# Patient Record
Sex: MALE | Race: WHITE | NOT HISPANIC OR LATINO | URBAN - METROPOLITAN AREA
[De-identification: names, ages, dates, MRNs, and addresses within clinical notes are randomized per-mention and may not be internally consistent; named-entity substitution may affect disease eponyms.]

---

## 2017-05-10 ENCOUNTER — TREATMENT (OUTPATIENT)
Dept: PHYSICAL THERAPY | Facility: CLINIC | Age: 47
End: 2017-05-10

## 2017-05-10 DIAGNOSIS — M54.42 CHRONIC BILATERAL LOW BACK PAIN WITH LEFT-SIDED SCIATICA: Primary | ICD-10-CM

## 2017-05-10 DIAGNOSIS — G89.29 CHRONIC BILATERAL LOW BACK PAIN WITH LEFT-SIDED SCIATICA: Primary | ICD-10-CM

## 2017-05-10 PROCEDURE — PTSPMIN2 PR PHYS THER SP 16 TO 30 MINUTES: Performed by: PHYSICAL THERAPIST

## 2017-05-16 ENCOUNTER — TREATMENT (OUTPATIENT)
Dept: PHYSICAL THERAPY | Facility: CLINIC | Age: 47
End: 2017-05-16

## 2017-05-16 DIAGNOSIS — M54.42 CHRONIC BILATERAL LOW BACK PAIN WITH LEFT-SIDED SCIATICA: Primary | ICD-10-CM

## 2017-05-16 DIAGNOSIS — G89.29 CHRONIC BILATERAL LOW BACK PAIN WITH LEFT-SIDED SCIATICA: Primary | ICD-10-CM

## 2017-05-16 PROCEDURE — PTSPMIN2 PR PHYS THER SP 16 TO 30 MINUTES: Performed by: PHYSICAL THERAPIST

## 2017-05-25 ENCOUNTER — TREATMENT (OUTPATIENT)
Dept: PHYSICAL THERAPY | Facility: CLINIC | Age: 47
End: 2017-05-25

## 2017-05-25 DIAGNOSIS — G89.29 CHRONIC BILATERAL LOW BACK PAIN WITH LEFT-SIDED SCIATICA: Primary | ICD-10-CM

## 2017-05-25 DIAGNOSIS — M54.42 CHRONIC BILATERAL LOW BACK PAIN WITH LEFT-SIDED SCIATICA: Primary | ICD-10-CM

## 2017-05-25 PROCEDURE — DRYNDL PR CUSTOM DRY NEEDLING SELF PAY: Performed by: PHYSICAL THERAPIST

## 2017-06-20 ENCOUNTER — TREATMENT (OUTPATIENT)
Dept: PHYSICAL THERAPY | Facility: CLINIC | Age: 47
End: 2017-06-20

## 2017-06-20 DIAGNOSIS — G89.29 CHRONIC BILATERAL LOW BACK PAIN WITH LEFT-SIDED SCIATICA: Primary | ICD-10-CM

## 2017-06-20 DIAGNOSIS — M54.42 CHRONIC BILATERAL LOW BACK PAIN WITH LEFT-SIDED SCIATICA: Primary | ICD-10-CM

## 2017-06-20 PROCEDURE — PTSPMIN2 PR PHYS THER SP 16 TO 30 MINUTES: Performed by: PHYSICAL THERAPIST

## 2017-06-20 NOTE — PROGRESS NOTES
Discharge Report    Patient Name: Adithya Montes De Oca       Patient MRN: [id number]  : 1970  Physician:  Date: 2017    Encounter Diagnoses   Name Primary?   • Chronic bilateral low back pain with left-sided sciatica Yes     Treatment has included therapeutic exercise, neuromuscular re-education, manual therapy and dry needling    Physical Therapy Daily Progress Note 17    Time In 0659  Time Out 0726    Adithya Montes De Oca reports: doing much better and huge difference in symptoms and performance in  Performance Training and no longer avoiding exercises.  Pt continues to use light weight for overhead squats. Pt reports low back discomfort at 1/10 with standing but able to stand for 30 minutes without back pain.  Pt reports no longer experiencing pain in bed.     OSWESTRY- BACK    Please answer every section and crystal in each section only the ONE answer which applies to you or most closely describes your problem.  Thank you.    Section 1- Pain Intensity  [x]  The pain comes and goes and is very mild.  []  The pain is mild and does not vary much.  []  The pain comes and goes and is moderate.  []  The pain is moderate and does not vary much.  []  The pain comes and goes and is severe.  []  The pain is severe and does not vary much.    Section 2- Personal Care  [x]  I would not have to change my way of washing or dressing in order to avoid pain.  []  I do not normally change my way of washing or dressing even though it causes      some pain.  []  Washing and dressing increases the pain but I manage not to change my way of doing it.  []  Washing and dressing increase the pain and I find it necessary to change my way of doing it.  []  Because of the pain I am unable to do some washing and dressing without help.  []  Because of the pain I am unable to do any washing or dressing without help  .  Section 3- Lifting  []  I can lift heavy weight without extra pain.  [x]  I can lift heavy weight but it causes extra  pain.  []  Pain prevents me lifting heavy weight off the floor.  []  Pain prevents me lifting heavy weight off the floor but I can manage if they are conveniently positioned, e.g., on a table.  []  Pain prevents me from lifting heavy weight but I can manage light to medium weight if it is conveniently positioned.  []  I can only lift very light weight at the most.    Section 4- Walking  [x] I have no pain on walking.  [] I have some pain on walking but it does not increase with distance.  [] I cannot walk more than One Mile without increasing pain.  [] I cannot walk more than 1/2 Mile without increasing pain.  [] I cannot walk more than 1/4 Mile without increasing pain.  [] I cannot walk at all without increasing pain.    Section 5- Sitting  [x] I can sit in any chair as long as I like.  [] I can sit only in my favorite chair as long as I like.  [] Pain prevents me from sitting more than one hour.  [] Pain prevents me from sitting more than 1/2 hour.  [] Pain prevents me from sitting more than 10 minutes.  [] I avoid sitting because it increases pain straight away.      Section 6- Standing  [] I can stand as long as I want without pain.  [] I have some pain on standing but it does not increase with time.  [x] I cannot stand for longer than one hour without increasing pain.  [] I cannot stand for longer than 1/2 hour without increasing pain.  [] I cannot stand for longer than 10 minutes without increasing pain.  [] I avoid standing because it increases the pain straight away.    Section 7- Sleeping  [x] I get no pain in bed.  [] I get pain in bed but it does not prevent me from sleeping well.  [] Because of pain my normal nights sleep is reduced by less than 1/4.  [] Because of pain my normal nights sleep is reduced by less than 1/2.  [] Because of pain my normal nights sleep is reduced by less than 3/4.  [] Pain prevents me from sleeping at all.    Section 8-Social Life  [x] My social life is normal and gives me no  pain.  [] My social life is normal but increases the degree of my pain.  [] Pain has no significant effect on my social life apart from limiting my more energetic interests e.g., dancing etc.  [] Pain has restricted my social life and I do not go out very often.  [] Pain has restricted my social life to my home.  [] I have hardly any social life because of pain.    Section 9- Traveling  [x] I get no pain while traveling.  [] I get some pain while traveling but none of my usual forms of travel make it any worse.  [] I get extra pain while traveling but it does not compel me to seek alternative forms of travel.  [] I get extra pain while traveling which compels me to seek alternative forms of travel.  [] Pain restricts all forms of travel.  [] Pain prevents all forms of travel except that done lying down.    Section 10 - Changing Degree of Pain  [] My pain is rapidly getting better.  [x] My pain fluctuates but overall is definitely getting better.  [] My pain seems to be getting better but improvement is slow at present.  [] My pain is neither getting better or worse.  [] My pain is gradually worsening.  [] My pain is rapidly worsening.                                                           Initial 6 Visits D/C Change   % Score   8%    VAS #                   Subjective     Objective     Active Range of Motion     Lumbar   Flexion: WFL    Tests     Additional Tests Details  Supine leg length (-)   Adduction drop test (-) bilaterally     See Exercise, Manual, and Modality Logs for complete treatment.       Assessment & Plan     Assessment  Assessment details: Pt demonstrates improved lumbar AROM, symptoms and function.  Pt demonstrates improved standing tolerance and local lumbo pelvic control/stabilization with ability to fully participate in [facility] Performance Training classes with minimal modifications.  Pt demonstrates good understanding and compliance of HEP and is appropriate for d/c to (I) with HEP with  instruction to call PT as needed.      Other - d/c to (I) with HEP    Progress towards goals: All Met    Discharge Reason: Patient achieved goals      Plan of Care: Continue with current home exercise program as instructed    Prognosis: excellent    Understanding at Discharge: excellent    Lili Betancur, PT, DPT  Physical Therapist

## 2017-06-20 NOTE — PATIENT INSTRUCTIONS
Purpose of treatment  ISAIAH exercises 1x every other day  HEP performance  Please view [facility] Burgerl for a complete list of HEP instructions.

## 2017-06-20 NOTE — PROGRESS NOTES
Physical Therapy Daily Progress Note    Time In 0659  Time Out 0726    Adithya Montes De Oca reports: doing much better and huge difference in symptoms and performance in  Performance Training and no longer avoiding exercises.  Pt continues to use light weight for overhead squats. Pt reports low back discomfort at 1/10 with standing but able to stand for 30 minutes without back pain.  Pt reports no longer experiencing pain in bed.     Subjective     Objective     Active Range of Motion     Lumbar   Flexion: WFL    Tests     Additional Tests Details  Supine leg length (-)   Adduction drop test (-) bilaterally     See Exercise, Manual, and Modality Logs for complete treatment.       Assessment & Plan     Assessment  Assessment details: Pt demonstrates improved lumbar AROM, symptoms and function.  Pt demonstrates improved standing tolerance and local lumbo pelvic control/stabilization with ability to fully participate in  Performance Training classes with minimal modifications.  Pt demonstrates good understanding and compliance of HEP and is appropriate for d/c to (I) with HEP with instruction to call PT as needed.          Other - d/c to (I) with HEP           Manual Therapy:    -     mins  84183;  Therapeutic Exercise:    -     mins  37625;     Neuromuscular Lori:    -    mins  76193;    Therapeutic Activity:     -     mins  17360;     Gait Training:      -     mins  59700;     Ultrasound:     -     mins  08514;    Electrical Stimulation:    -     mins  78989 ( );  Dry Needling     -     mins self-pay    Timed Treatment:   -   mins   Total Treatment:     27   mins - Self Pay    Lili Betancur, PT, DPT  Physical Therapist

## 2017-10-19 ENCOUNTER — TREATMENT (OUTPATIENT)
Dept: PHYSICAL THERAPY | Facility: CLINIC | Age: 47
End: 2017-10-19

## 2017-10-19 DIAGNOSIS — G89.29 CHRONIC BILATERAL LOW BACK PAIN WITH LEFT-SIDED SCIATICA: Primary | ICD-10-CM

## 2017-10-19 DIAGNOSIS — M54.42 CHRONIC BILATERAL LOW BACK PAIN WITH LEFT-SIDED SCIATICA: Primary | ICD-10-CM

## 2017-10-19 PROCEDURE — PTSPMIN1 PR PHYS THER SP UP TO 15 MINUTES: Performed by: PHYSICAL THERAPIST

## 2017-10-19 NOTE — PATIENT INSTRUCTIONS
HEP performance  Please view [facility]ithya Blondeel for a complete list of HEP instructions.  Purpose of treatment

## 2017-10-19 NOTE — PROGRESS NOTES
Physical Therapy Initial Evaluation and Plan of Care    TIME IN 0700 TIME OUT 0718  Patient: Adithya Montes De Oca   : 1970  Diagnosis/ICD-10 Code:  Chronic bilateral low back pain with left-sided sciatica [M54.42, G89.29]  Referring practitioner: No ref. provider found     Subjective Evaluation    History of Present Illness    Subjective comment: Pt has been participating in Performance Training Recovery Class.  Pt has been keeping exercises going regularly and traveling a lot without low back pain.Pain  Current pain ratin  At best pain ratin  At worst pain ratin    Treatments  Previous treatment: physical therapy  Patient Goals  Patient goals for therapy: increased strength             Objective       Tests     Additional Tests Details  FMS for rotary stability R 3/3 and L 2/3  Sahrmann for lumbar stabilization 0.5/5 with ability to maintain neutral spine during unweighted          Assessment & Plan     Assessment  Impairments: impaired physical strength  Assessment details: Pt presents to physical therapy for check-in and progression of HEP to continue to maintain gains and progress trunk control/stabilization.  Pt demonstrates improved local trunk control/stabilization and rotary stability.  Pt demonstrated weakness on left versus right rotary stability.  Progressed HEP and reviewed performance and importance of continued HEP performance with patient.  Pt demonstrates good understanding and will follow up with PT as needed.    Goals  Plan Goals: In 4 weeks  Pt will be (I) with HEP and report no increased low back pain during daily or exercise activity.      Plan  Therapy options: will be seen for skilled physical therapy services  Planned modality interventions: thermotherapy (hydrocollator packs), TENS and electrical stimulation/Russian stimulation  Planned therapy interventions: abdominal trunk stabilization, functional ROM exercises, joint mobilization, home exercise program, manual  therapy, neuromuscular re-education, soft tissue mobilization, strengthening, stretching and therapeutic activities  Frequency: as needed - patient will call.  Duration in weeks: 4  Treatment plan discussed with: patient        Manual Therapy:    -     mins  71485;  Therapeutic Exercise:    -     mins  29598;     Neuromuscular Lori:    -    mins  40261;    Therapeutic Activity:     -     mins  18531;     Gait Training:      -     mins  76888;     Ultrasound:     -     mins  49013;    Electrical Stimulation:    -     mins  91478 ( );  Dry Needling     -     mins self-pay    Timed Treatment:   15   mins   Total Treatment:     18   mins    PT SIGNATURE: Lili Betancur, PT, DPT   DATE TREATMENT INITIATED: 10/19/2017    Initial Certification  Certification Period: 1/17/2018  I certify that the therapy services are furnished while this patient is under my care.  The services outlined above are required by this patient, and will be reviewed every 90 days.     PHYSICIAN:       DATE:     Please sign and return via fax to [phone]. Thank you, [facility].

## 2018-02-27 ENCOUNTER — TREATMENT (OUTPATIENT)
Dept: PHYSICAL THERAPY | Facility: CLINIC | Age: 48
End: 2018-02-27

## 2018-02-27 DIAGNOSIS — M25.521 RIGHT ELBOW PAIN: Primary | ICD-10-CM

## 2018-02-27 PROCEDURE — DRYNDL PR CUSTOM DRY NEEDLING SELF PAY: Performed by: PHYSICAL THERAPIST

## 2018-02-27 PROCEDURE — PTSPMIN2 PR PHYS THER SP 16 TO 30 MINUTES: Performed by: PHYSICAL THERAPIST

## 2018-02-27 NOTE — PROGRESS NOTES
Physical Therapy Initial Evaluation and Plan of Care    TIME IN 1407 TIME OUT 1446  Patient: Adithya Montes De Oca   : 1970  Diagnosis/ICD-10 Code:  Right elbow pain [M25.521]  Referring practitioner: No ref. provider found    Subjective Evaluation    History of Present Illness  Date of onset: 2018  Mechanism of injury: Pt reports insidious onset of right elbow pain about 6 weeks ago but works out regularly in [employer] Performance Training class.  Pt reports performing thruster on  and increased pain at right shoulder blade on press.    Subjective comment: Pt reports lower back is great.  Pain  Current pain ratin  At worst pain ratin (right shoulder blade)  Location: right superior to elbow joint and extending to distal to elbow joint about couple inches each direction  Quality: dull ache  Alleviating factors: arms propped up while topping, not using it.  Aggravating factors: sleeping (resting, texting)    Hand dominance: right    Diagnostic Tests  No diagnostic tests performed    Treatments  No previous or current treatments  Patient Goals  Patient goals for therapy: decreased pain, return to sport/leisure activities and increased strength             Objective       Palpation     Right   No palpable tenderness to the wrist flexors.   Hypertonic in the wrist extensors. Tenderness of the wrist extensors.     Tenderness     Right Elbow   No tenderness in the lateral epicondyle.     Right Wrist/Hand   Tenderness in the lateral epicondyle.     Strength/Myotome Testing     Right Shoulder     Isolated Muscles   Lower trapezius: 3   Middle trapezius: 3- (increased shoulder, elbow and forearm pain)     Right Wrist/Hand   Wrist extension: 4- (pain increased)  Wrist flexion: 4+         Assessment & Plan     Assessment  Impairments: impaired physical strength, lacks appropriate home exercise program and pain with function  Assessment details: Pt is active 47 y.o. Male who presents with acute right elbow and  shoulder pain.  Pt demonstrates signs and symptoms consistent with strain to right wrist extensors and supinator.  Pt also demonstrates scapular weakness contributing to elbow dysfunction and shoulder pain.  Initiated dry needling, instrument assisted soft tissue massage and therapeutic exercises today.  Pt requires skilled physical therapy to address impairments and return to prior level of function including typing, texting, and participation in workout classes without pain.  Prognosis: good  Functional Limitations: carrying objects, lifting, pulling, pushing and uncomfortable because of pain  Goals  Plan Goals: Short-Term Goals - In 2 weeks:  1. Pt will be (I) with initial HEP.  2. Pt will report pain at worst </=6/10    Long-Term Goals - In 6 weeks:  1. Pt will report no pain with work or daily activities.  2. Right wrist extension and supination strength 5-/5 without pain.  3. Pt will report ability to participate fully in [facility] Performance Training class without pain.  4. DASH score </= 5% to demonstrate functional improvement.    Plan  Therapy options: will be seen for skilled physical therapy services  Planned modality interventions: electrical stimulation/Russian stimulation, cryotherapy and thermotherapy (hydrocollator packs)  Planned therapy interventions: body mechanics training, functional ROM exercises, home exercise program, joint mobilization, manual therapy, neuromuscular re-education, soft tissue mobilization, strengthening and therapeutic activities  Frequency: 2x month  Duration in weeks: 6  Treatment plan discussed with: patient        Manual Therapy:    8     mins  80348;  Therapeutic Exercise:    10     mins  79945;     Neuromuscular Lori:    5    mins  64950;    Therapeutic Activity:     -     mins  23349;     Gait Training:      -     mins  90659;     Ultrasound:     -     mins  72540;    Electrical Stimulation:    -     mins  20270 ( );  Dry Needling     8     mins self-pay    Timed  Treatment:   23   mins   Total Treatment:     39   mins    PT SIGNATURE: Lili Betancur, PT, DPT   DATE TREATMENT INITIATED: 2/27/2018    Initial Certification  Certification Period: 5/28/2018  I certify that the therapy services are furnished while this patient is under my care.  The services outlined above are required by this patient, and will be reviewed every 90 days.     PHYSICIAN:       DATE:     Please sign and return via fax to [phone]. Thank you, [facility].

## 2018-03-13 ENCOUNTER — TREATMENT (OUTPATIENT)
Dept: PHYSICAL THERAPY | Facility: CLINIC | Age: 48
End: 2018-03-13

## 2018-03-13 DIAGNOSIS — M25.521 RIGHT ELBOW PAIN: Primary | ICD-10-CM

## 2018-03-13 PROCEDURE — PTSPMIN1 PR PHYS THER SP UP TO 15 MINUTES: Performed by: PHYSICAL THERAPIST

## 2018-03-13 PROCEDURE — DRYNDL PR CUSTOM DRY NEEDLING SELF PAY: Performed by: PHYSICAL THERAPIST

## 2018-03-13 NOTE — PROGRESS NOTES
Physical Therapy Daily Progress Note    Time In 1400  Time Out 1428    Adithya Montes De Oca reports: improvement in symptoms after last treatment session and HEP is going well with only discomfort during wrist extensor mass stretching.  Pt reports trying to use Left UE more than Right, but continued soreness in right arm doing workouts,especially with chin ups and biceps curls.     Subjective     Objective       Palpation     Right   Hypertonic in the wrist extensors. Tenderness of the wrist extensors.     Tenderness     Right Elbow   Tenderness in the lateral epicondyle.     Right Wrist/Hand   Tenderness in the lateral epicondyle.      See Exercise, Manual, and Modality Logs for complete treatment.       Assessment & Plan     Assessment  Assessment details: Pt demonstrates good compliance with HEP and attention to activity modification.  Pt continues to present with symptoms consistent with lateral epicondyalgia and associated tendinopathy.  Focused dry needling with increased dose to proximal wrist extensor mass and tendons.  Kinesiotape applied post-treatment to improve circulation and decrease pain.         Progress strengthening /stabilization /functional activity  Review HEP and update as indicated         Manual Therapy:    12     mins  76622;  Therapeutic Exercise:    -     mins  70644;     Neuromuscular Lori:    -    mins  99697;    Therapeutic Activity:     -     mins  59235;     Gait Training:      -     mins  74477;     Ultrasound:     -     mins  48459;    Electrical Stimulation:    -     mins  69194 ( );  Dry Needling     10     mins self-pay    Timed Treatment:   22   mins   Total Treatment:     28   mins    Lili Betancur, PT, DPT  Physical Therapist

## 2018-03-13 NOTE — PATIENT INSTRUCTIONS
Purpose of treatment  Pathology and involved anatomy  Activity modification including weight lifting regressions  Continued HEP performance

## 2018-03-20 ENCOUNTER — TREATMENT (OUTPATIENT)
Dept: PHYSICAL THERAPY | Facility: CLINIC | Age: 48
End: 2018-03-20

## 2018-03-20 DIAGNOSIS — M25.521 RIGHT ELBOW PAIN: Primary | ICD-10-CM

## 2018-03-20 DIAGNOSIS — G89.29 CHRONIC BILATERAL LOW BACK PAIN WITH LEFT-SIDED SCIATICA: ICD-10-CM

## 2018-03-20 DIAGNOSIS — M54.42 CHRONIC BILATERAL LOW BACK PAIN WITH LEFT-SIDED SCIATICA: ICD-10-CM

## 2018-03-20 PROCEDURE — DRYNDL PR CUSTOM DRY NEEDLING SELF PAY: Performed by: PHYSICAL THERAPIST

## 2018-03-20 PROCEDURE — PTSPMIN1 PR PHYS THER SP UP TO 15 MINUTES: Performed by: PHYSICAL THERAPIST

## 2018-03-20 NOTE — PROGRESS NOTES
Physical Therapy Daily Progress Note    Time In 0830  Time Out 0900    Adithya Montes De Oca reports: some improvement in elbow symptoms, but arm is a nuisance.      Subjective     Objective       Palpation     Right   Hypertonic in the wrist extensors. Tenderness of the wrist extensors.     Tenderness     Right Elbow   Tenderness in the lateral epicondyle.     Right Wrist/Hand   Tenderness in the lateral epicondyle.     Strength/Myotome Testing     Right Wrist/Hand   Wrist extension: 4 (increased pain)     See Exercise, Manual, and Modality Logs for complete treatment.       Assessment & Plan     Assessment  Assessment details: Reviewed and progressed low back exercises today.  Pt demonstrates excellent compliance with HEP and is able to fully participate in  Performance Training classes without low back pain. Pt demonstrates some improvement in lateral elbow symptoms and increased tolerance to activity, but continued signs and symptoms consistent with lateral epicondylagia and wrist extensor mass strain.  Decreased dose of dry needling today due to increased soreness after last treatment.             Progress per Plan of Care           Manual Therapy:    12    mins  82154;  Therapeutic Exercise:    -     mins  87860;     Neuromuscular Lori:    -    mins  40293;    Therapeutic Activity:     -     mins  95525;     Gait Training:      -     mins  85164;     Ultrasound:     -     mins  45417;    Electrical Stimulation:    -     mins  72339 ( );  Dry Needling     10     mins self-pay    Timed Treatment:   22   mins   Total Treatment:     30   mins    Lili Betancur, PT, DPT  Physical Therapist

## 2018-05-07 ENCOUNTER — TREATMENT (OUTPATIENT)
Dept: PHYSICAL THERAPY | Facility: CLINIC | Age: 48
End: 2018-05-07

## 2018-05-07 DIAGNOSIS — M25.521 RIGHT ELBOW PAIN: Primary | ICD-10-CM

## 2018-05-07 DIAGNOSIS — M54.42 CHRONIC BILATERAL LOW BACK PAIN WITH LEFT-SIDED SCIATICA: ICD-10-CM

## 2018-05-07 DIAGNOSIS — G89.29 CHRONIC BILATERAL LOW BACK PAIN WITH LEFT-SIDED SCIATICA: ICD-10-CM

## 2018-05-07 PROCEDURE — PTSPMIN1 PR PHYS THER SP UP TO 15 MINUTES: Performed by: PHYSICAL THERAPIST

## 2018-05-07 NOTE — PROGRESS NOTES
"Physical Therapy Daily Progress Note    Time In 0800  Time Out 0822    Adithya Montes De Oca reports: doing well and excellent compliance with HEP.  Pt reports elbow is now feeling like an \"old injury\".  Pt reports \"weakness\" in the morning after performing bench press.   Pt reports overall a lot better.      Subjective     Objective       Strength/Myotome Testing     Right Wrist/Hand   Wrist extension: 4 (increased pain)  Wrist flexion: 5     See Exercise, Manual, and Modality Logs for complete treatment.       Assessment & Plan     Assessment  Assessment details: Pt demonstrates decreased sensitivity, improved strength and tolerance to activity related to right elbow.  Pt continues to demonstrate weakness and pain with resisted testing.  Progressed strengthening and stretching activities for wrist extensor mass.  Pt demonstrates good understanding and excellent compliance with HEP.  Reviewed Lumbopelvic HEP and recommendations made for appropriate progressions of exercises when needed.        Progress per Plan of Care           Manual Therapy:    -     mins  45766;  Therapeutic Exercise:    15    mins  56308;     Neuromuscular Lori:    -    mins  66417;    Therapeutic Activity:     -     mins  52003;     Gait Training:      -     mins  05742;     Ultrasound:     -     mins  76148;    Electrical Stimulation:    -     mins  77012 ( );  Dry Needling     -     mins self-pay    Timed Treatment:   15   mins   Total Treatment:     22   mins    Lili Betancur, PT, DPT  Physical Therapist    "

## 2018-05-07 NOTE — PATIENT INSTRUCTIONS
Purpose of treatment  Appropriate response to treatment  HEP performance  Please view [facility]am[facility] Burgerl for a complete list of HEP instructions.

## 2018-05-09 ENCOUNTER — APPOINTMENT (OUTPATIENT)
Dept: GENERAL RADIOLOGY | Facility: HOSPITAL | Age: 48
End: 2018-05-09

## 2018-05-09 PROCEDURE — 73630 X-RAY EXAM OF FOOT: CPT | Performed by: EMERGENCY MEDICINE

## 2018-05-09 PROCEDURE — 73610 X-RAY EXAM OF ANKLE: CPT | Performed by: EMERGENCY MEDICINE

## 2018-05-30 ENCOUNTER — TREATMENT (OUTPATIENT)
Dept: PHYSICAL THERAPY | Facility: CLINIC | Age: 48
End: 2018-05-30

## 2018-05-30 DIAGNOSIS — S92.355D NONDISPLACED FRACTURE OF FIFTH METATARSAL BONE, LEFT FOOT, SUBSEQUENT ENCOUNTER FOR FRACTURE WITH ROUTINE HEALING: ICD-10-CM

## 2018-05-30 DIAGNOSIS — M79.672 LEFT FOOT PAIN: Primary | ICD-10-CM

## 2018-05-30 PROCEDURE — PTSPMIN2 PR PHYS THER SP 16 TO 30 MINUTES: Performed by: PHYSICAL THERAPIST

## 2018-05-30 NOTE — PROGRESS NOTES
"Physical Therapy Initial Evaluation and Plan of Care    TIME IN 0810 TIME OUT 09  Patient: Adithya Montes De Oca   : 1970  Diagnosis/ICD-10 Code:  Left foot pain [M79.672]  Referring practitioner: No ref. provider found    Subjective Evaluation    History of Present Illness  Date of onset: 2018  Mechanism of injury: Pt reports jumping off box in Performance Training class and landed on 2 feet and turned mid-air.  Pt reports feeling a \"crack\" on landing and went to urgent care on [facility] and followed up with Orthopedist next day.  Pt returned to [place] to Urgent Care after 2 weeks.  Pt MD reported 5th metatarsal Osmin's fracture.  Pt report from MD to walk around home without boot and try to walk on it.        Precautions and Work Restrictions: walking boot for ambulation out of home; follow up with MD in 6 weeks in [place]; MD reported may put weight on foot without boot onPain  At best pain ratin  At worst pain ratin  Location: left lateral foot at base of 5th metatarsal  Quality: sharp and dull ache  Relieving factors: rest (elevation, herbal medication for swelling)  Aggravating factors: ambulation    Diagnostic Tests  X-ray: abnormal (fracture base of 5th metatarsal)    Patient Goals  Patient goals for therapy: increased strength, return to sport/leisure activities, decreased pain and decreased edema             Objective       Tenderness   Left Ankle/Foot   Tenderness in the fifth metatarsal base.     Additional Tenderness Details  Shaft of 5th metatarsal (+) TTP    Active Range of Motion   Left Ankle/Foot   Dorsiflexion (kf): 10 degrees with pain  Plantar flexion: 55 degrees with pain  Inversion: 25 degrees with pain  Eversion: 20 degrees with pain    Swelling   Left Ankle/Foot   Figure 8: 59 cm    Right Ankle/Foot   Figure 8 girth: 58.3.    Ambulation   Weight-Bearing Status   Weight-Bearing Status (Left): non-weight-bearing   Weight-Bearing Status (Right): full weight-bearing  "   Assistive device used: crutches    Additional Weight-Bearing Status Details  Pt wearing walking boot         Assessment & Plan     Assessment  Impairments: abnormal gait, abnormal or restricted ROM, impaired balance, impaired physical strength, lacks appropriate home exercise program and pain with function  Assessment details: Pt presents 3 weeks s/p fracture of left base of 5th metatarsal.  Pt demonstrates decreased ROM, strength, balance and pain associated with recent fracture.  Pt requires skilled physical therapy to address impairments and return to prior level of function including ambulation without assistive device and participation in Adult athlete class at [facility] [employer].   Prognosis: good  Functional Limitations: lifting, walking and uncomfortable because of pain  Goals  Plan Goals: Short - Term Goals - In 2 weeks:  1. Pt will be (I) with initial HEP.  2. Left ankle figure 8 measure improve to 58.5.  3. Pt will walk community distances in walking boot and without crutches without pain.  4.  Left ankle AROM improve to plantarflexion 60 degrees and inversion 30 degrees.    Long-Term Goals - In 8 weeks:  1. Pt will ambulate community distances without walking boot or assistive device with equal stance time, step length and without pain.  2. Left ankle dorsiflexion, plantarflexion, inversion and eversion strength improve to 5/5 without pain.  3. Pt will resume jogging with equal stance time and step length and without pain.  4. Pt will perform single leg squat on left without pain, hip IR or contralateral pelvic drop.  5. LEFS score improve to 80/80 to demonstrate functional improvement.     Plan  Therapy options: will be seen for skilled physical therapy services  Planned modality interventions: cryotherapy, electrical stimulation/Russian stimulation and thermotherapy (hydrocollator packs)  Planned therapy interventions: abdominal trunk stabilization, balance/weight-bearing training,  body mechanics training, functional ROM exercises, gait training, home exercise program, joint mobilization, manual therapy, neuromuscular re-education, soft tissue mobilization, spinal/joint mobilization, strengthening, stretching and therapeutic activities  Frequency: 2x month  Duration in weeks: 8  Treatment plan discussed with: patient  Plan details: Frequency is low for PT sessions due to pt only in town for short periods of time before heading back to [place].         Manual Therapy:    -     mins  96353;  Therapeutic Exercise:    15     mins  84076;     Neuromuscular Lori:    -    mins  78764;    Therapeutic Activity:     -     mins  04022;     Gait Training:      -     mins  00688;     Ultrasound:     -     mins  32113;    Electrical Stimulation:    -     mins  69953 ( );  Dry Needling     -     mins self-pay    Timed Treatment:   30   mins   Total Treatment:     52   mins    PT SIGNATURE: Lili Betancur PT, DPT   DATE TREATMENT INITIATED: 5/30/2018    Initial Certification  Certification Period: 8/28/2018  I certify that the therapy services are furnished while this patient is under my care.  The services outlined above are required by this patient, and will be reviewed every 90 days.     PHYSICIAN:       DATE:     Please sign and return via fax to [phone]. Thank you, [facility].

## 2018-05-31 NOTE — PATIENT INSTRUCTIONS
Walk longer distances with boot on  Protection and healing phase  Pathology and involved anatomy  HEP performance  Please view [facility] Adithya Stilesndeel for a complete list of HEP instructions.

## 2018-06-22 ENCOUNTER — TREATMENT (OUTPATIENT)
Dept: PHYSICAL THERAPY | Facility: CLINIC | Age: 48
End: 2018-06-22

## 2018-06-22 DIAGNOSIS — M79.672 LEFT FOOT PAIN: Primary | ICD-10-CM

## 2018-06-22 PROCEDURE — PTSPMIN1 PR PHYS THER SP UP TO 15 MINUTES: Performed by: PHYSICAL THERAPIST

## 2018-06-22 NOTE — PROGRESS NOTES
Physical Therapy Daily Progress Note    Time In 1535  Time Out 1600    Adithya Montes De Oca reports: doing a whole lot better and no longer using crutches.  Pt reports plan to follow up with MD next week for radiographs and to identify level of healing.  Pt reports plateau with home exercises and desires advice and update of HEP.    Subjective     Objective       Active Range of Motion     Additional Active Range of Motion Details  Gross ankle AROM left inversion and eversion limited and pain-less     See Exercise, Manual, and Modality Logs for complete treatment.       Assessment & Plan     Assessment  Assessment details: Pt demonstrated difficulty with ankle inversion and eversion AROM secondary to weakness.  Pt educated on progression of HEP, importance of walking boot and healing time.  Pt instructed on continued AROM and isometric holds for ankle inversion and eversion prior to adding external resistance to those motions.  Pt will likely require additional physical therapy to progress level of function pending results of upcoming MD follow up.          Progress per Plan of Care pending results of MD follow up           Manual Therapy:    -     mins  39264;  Therapeutic Exercise:    15     mins  56666;     Neuromuscular Lori:    -    mins  91300;    Therapeutic Activity:     -     mins  39033;     Gait Training:      -     mins  90452;     Ultrasound:     -     mins  13169;    Electrical Stimulation:    -     mins  72558 ( );  Dry Needling     -     mins self-pay    Timed Treatment:   15   mins   Total Treatment:     25   mins    Lili Betancur, PT, DPT  Physical Therapist

## 2018-06-24 NOTE — PATIENT INSTRUCTIONS
HEP performance  Please view [facility] Adithya Blondeel for a complete list of HEP instructions.  See assessment for further details

## 2018-07-06 ENCOUNTER — TREATMENT (OUTPATIENT)
Dept: PHYSICAL THERAPY | Facility: CLINIC | Age: 48
End: 2018-07-06

## 2018-07-06 DIAGNOSIS — S92.355D NONDISPLACED FRACTURE OF FIFTH METATARSAL BONE, LEFT FOOT, SUBSEQUENT ENCOUNTER FOR FRACTURE WITH ROUTINE HEALING: ICD-10-CM

## 2018-07-06 DIAGNOSIS — M79.672 LEFT FOOT PAIN: Primary | ICD-10-CM

## 2018-07-06 PROCEDURE — PTSPMIN1 PR PHYS THER SP UP TO 15 MINUTES: Performed by: PHYSICAL THERAPIST

## 2018-07-06 NOTE — PATIENT INSTRUCTIONS
HEP performance  Please view [facility] Adithya Blondeel for a complete list of HEP instructions.

## 2018-07-06 NOTE — PROGRESS NOTES
Physical Therapy Daily Progress Note    Time In 0745  Time Out 0800    Adithya Montes De Oca reports: doing much better and desires discussion about appropriate progression.  Pt reports MD visit identified good healing but bone is not completely healed yet, d/c boot, and refrain from running and jumping.     Subjective     Objective   See Exercise, Manual, and Modality Logs for complete treatment.       Assessment & Plan     Assessment  Assessment details: Treatment session focused on pt education.  Pt demonstrated increased WB on R LE during bilateral squats and performed a few repetitions in front of mirror for visual feedback.  Performance Training  educated on pt presentation and to assist with cueing during class to improve WB on L LE.  Pt reported some difficulty with lunging due to instability on L LE.  Pt instructed on posterior lunge with Valslide and tried a few in treatment session and added to HEP.  Pt demonstrates good understanding of HEP.          Progress per Plan of Care           Manual Therapy:    -     mins  52710;  Therapeutic Exercise:    -     mins  55457;     Neuromuscular Lori:    -    mins  23410;    Therapeutic Activity:     -     mins  66553;     Gait Training:      -     mins  74562;     Ultrasound:     -     mins  43744;    Electrical Stimulation:    -     mins  50431 ( );  Dry Needling     -     mins self-pay    Timed Treatment:   15   mins   Total Treatment:     15   mins    Lili Betancur, PT, DPT  Physical Therapist

## 2018-08-01 ENCOUNTER — TREATMENT (OUTPATIENT)
Dept: PHYSICAL THERAPY | Facility: CLINIC | Age: 48
End: 2018-08-01

## 2018-08-01 DIAGNOSIS — G89.29 CHRONIC BILATERAL LOW BACK PAIN WITH LEFT-SIDED SCIATICA: ICD-10-CM

## 2018-08-01 DIAGNOSIS — M54.42 CHRONIC BILATERAL LOW BACK PAIN WITH LEFT-SIDED SCIATICA: ICD-10-CM

## 2018-08-01 DIAGNOSIS — M79.672 LEFT FOOT PAIN: Primary | ICD-10-CM

## 2018-08-01 DIAGNOSIS — S92.355D NONDISPLACED FRACTURE OF FIFTH METATARSAL BONE, LEFT FOOT, SUBSEQUENT ENCOUNTER FOR FRACTURE WITH ROUTINE HEALING: ICD-10-CM

## 2018-08-01 DIAGNOSIS — M25.521 RIGHT ELBOW PAIN: ICD-10-CM

## 2018-08-01 PROCEDURE — PTSPMIN2 PR PHYS THER SP 16 TO 30 MINUTES: Performed by: PHYSICAL THERAPIST

## 2018-08-01 NOTE — PROGRESS NOTES
Physical Therapy Daily Progress Note    Time In 0800  Time Out 0850    Adithya Montes De Oca reports: doing well and regular compliance with HEP.    Subjective     Objective       Active Range of Motion   Left Ankle/Foot   Plantar flexion: 40 degrees   Inversion: 30 degrees     Strength/Myotome Testing     Left Ankle/Foot   Plantar flexion: 4- (5 unilateral heel raises)     See Exercise, Manual, and Modality Logs for complete treatment.       Assessment & Plan     Assessment  Assessment details: Treatment focused on review and appropriate progression of HEP.  Pt demonstrates improved ankle inversion AROM but limitations in ROM and strength in left plantarflexion.  Pt required mild verbal cues during therapeutic exercises including avoiding lateral trunk sway during resisted band walks.  Updated HEP accordingly today.         Progress strengthening /stabilization /functional activity           Manual Therapy:    -     mins  11998;  Therapeutic Exercise:    30     mins  07494;     Neuromuscular Lori:    -    mins  35830;    Therapeutic Activity:     -     mins  21802;     Gait Training:      -     mins  09249;     Ultrasound:     -     mins  52847;    Electrical Stimulation:    -     mins  04440 ( );  Dry Needling     -     mins self-pay    Timed Treatment:   30   mins direct treatment  Total Treatment:     50   mins    Lili Betancur, PT DPT  Physical Therapist

## 2020-05-26 NOTE — PATIENT INSTRUCTIONS
Activity modification  Neutral wrist position  Pathology and involved anatomy  HEP performance  Please view [facility] Adithya Gondeel for a complete list of HEP instructions.     - - -